# Patient Record
Sex: FEMALE | Race: WHITE | URBAN - METROPOLITAN AREA
[De-identification: names, ages, dates, MRNs, and addresses within clinical notes are randomized per-mention and may not be internally consistent; named-entity substitution may affect disease eponyms.]

---

## 2022-04-04 ENCOUNTER — OFFICE VISIT (OUTPATIENT)
Dept: URBAN - METROPOLITAN AREA CLINIC 82 | Facility: CLINIC | Age: 27
End: 2022-04-04
Payer: COMMERCIAL

## 2022-04-04 DIAGNOSIS — H52.13 MYOPIA, BILATERAL: Primary | ICD-10-CM

## 2022-04-04 PROCEDURE — 92310 CONTACT LENS FITTING OU: CPT | Performed by: OPTOMETRIST

## 2022-04-04 PROCEDURE — 92004 COMPRE OPH EXAM NEW PT 1/>: CPT | Performed by: OPTOMETRIST

## 2022-04-04 ASSESSMENT — VISUAL ACUITY
OS: 20/20
OD: 20/20

## 2022-04-04 ASSESSMENT — INTRAOCULAR PRESSURE
OS: 13
OD: 9

## 2022-04-04 ASSESSMENT — KERATOMETRY
OS: 44.38
OD: 44.38

## 2022-04-04 NOTE — IMPRESSION/PLAN
Impression: Myopia, bilateral: H52.13. Plan: Educated pt on exam findings. Updated and finalized SRx. Educated pt on 1-2 week adaptation period with updated SRx. Educated pt on proper CL hygiene, replacement of lenses, avoiding sleeping and swimming in lenses. Educated pt discontinue CL wear if eyes become painful, red, or irritated and RTC ASAP for evaluation. Pt expressed understanding. Finalized CLRx today. RTC 1 year for CE w/CLs, or PRN. Discussed changing lenses more frequently, purchasing a back up pair of glasses, and avoiding sleeping in lenses. Pt declined dilation today. Educated pt the periphery of the back of the eye was unable to be assessed without dilation or photos, pt expressed understanding. Educated pt on signs/symptoms of RDs and RTC ASAP if changes in vision occur.